# Patient Record
(demographics unavailable — no encounter records)

---

## 2025-05-21 NOTE — PROCEDURE
[FreeTextEntry3] : Ultrasound-guided aspiration right breast  The patient has a palpable mass in the upper outer right breast  A 2 cm complex cyst is visualized  After informed consent was obtained, 1% lidocaine was infiltrated to the skin and the cyst was aspirated to completion  No residual mass was noted  BI-RADS 2

## 2025-05-21 NOTE — ASSESSMENT
[FreeTextEntry1] : Right breast mass appears to be a complex cyst  The cyst was aspirated  No residual mass was noted  Patient was reassured  Follow-up as needed  A total of 30 minutes was spent in consultation

## 2025-05-21 NOTE — HISTORY OF PRESENT ILLNESS
[FreeTextEntry1] : REF BY DR. MARLOW  RIGHT  BREAST PALPABLE MASS for about 1 month  Denies breast pain, redness to the skin [facility] No Breast Surgeries/biopsies  Both daughters have a history for breast cancer  No family history for ovarian cancer  No BRCA  testing  ! daughter is negative for BRCA  other daughter has unknown significance for BRCA

## 2025-05-21 NOTE — PHYSICAL EXAM
[Normocephalic] : normocephalic [Atraumatic] : atraumatic [Supple] : supple [No Supraclavicular Adenopathy] : no supraclavicular adenopathy [Examined in the supine and seated position] : examined in the supine and seated position [No Nipple Retraction] : no right nipple retraction [No Nipple Discharge] : no right nipple discharge [No Axillary Lymphadenopathy] : no left axillary lymphadenopathy [No Edema] : no edema [No Rashes] : no rashes [No Ulceration] : no ulceration [de-identified] : Palpable mass upper outer right breast

## 2025-05-21 NOTE — HISTORY OF PRESENT ILLNESS
[FreeTextEntry1] : REF BY DR. MARLOW  RIGHT  BREAST PALPABLE MASS for about 1 month  Denies breast pain, redness to the skin Last [facility] No Breast Surgeries/biopsies  Both daughters have a history for breast cancer  No family history for ovarian cancer  No BRCA  testing  ! daughter is negative for BRCA  other daughter has unknown significance for BRCA

## 2025-05-21 NOTE — PHYSICAL EXAM
[Normocephalic] : normocephalic [Atraumatic] : atraumatic [Supple] : supple [No Supraclavicular Adenopathy] : no supraclavicular adenopathy [Examined in the supine and seated position] : examined in the supine and seated position [No Nipple Retraction] : no right nipple retraction [No Nipple Discharge] : no right nipple discharge [No Axillary Lymphadenopathy] : no left axillary lymphadenopathy [No Edema] : no edema [No Rashes] : no rashes [No Ulceration] : no ulceration [de-identified] : Palpable mass upper outer right breast